# Patient Record
Sex: FEMALE | Race: WHITE | Employment: UNEMPLOYED | URBAN - METROPOLITAN AREA
[De-identification: names, ages, dates, MRNs, and addresses within clinical notes are randomized per-mention and may not be internally consistent; named-entity substitution may affect disease eponyms.]

---

## 2017-01-08 ENCOUNTER — HOSPITAL ENCOUNTER (EMERGENCY)
Facility: HOSPITAL | Age: 52
Discharge: HOME | End: 2017-01-08
Attending: EMERGENCY MEDICINE
Payer: COMMERCIAL

## 2017-01-08 VITALS
DIASTOLIC BLOOD PRESSURE: 65 MMHG | SYSTOLIC BLOOD PRESSURE: 118 MMHG | OXYGEN SATURATION: 100 % | TEMPERATURE: 98 F | RESPIRATION RATE: 20 BRPM | HEART RATE: 68 BPM | WEIGHT: 125 LBS | HEIGHT: 55 IN | BODY MASS INDEX: 28.93 KG/M2

## 2017-01-08 DIAGNOSIS — H43.391 FLOATERS IN VISUAL FIELD, RIGHT: Primary | ICD-10-CM

## 2017-01-08 PROCEDURE — 99283 EMERGENCY DEPT VISIT LOW MDM: CPT

## 2017-01-08 RX ORDER — ESCITALOPRAM OXALATE 10 MG/1
10 TABLET ORAL DAILY
COMMUNITY

## 2017-01-08 NOTE — ED INITIAL ASSESSMENT (HPI)
Pt here for visual disturbances in the right eye. Pt saw flashes yesterday and then today saw a string in the right eye then a string that has turned into floaters.

## 2017-01-08 NOTE — ED PROVIDER NOTES
Patient Seen in: [facility] Emergency Department    History   Patient presents with: Eye Visual Problem (opthalmic)    Stated Complaint: visual disturbance -     HPI    18-year-old female presents reporting floaters and flashes for the past day.   Aspen Evian Dinning Uncorrected    Physical Exam    General:  Vitals as listed. No acute distress   HEENT: Funduscopic examination shows normal red reflex bilaterally. No evidence of significant intraocular hemorrhage. I noted some debris on right-sided examination.   Pupils

## 2018-05-15 PROCEDURE — 88175 CYTOPATH C/V AUTO FLUID REDO: CPT | Performed by: OBSTETRICS & GYNECOLOGY

## 2018-05-15 PROCEDURE — 87624 HPV HI-RISK TYP POOLED RSLT: CPT | Performed by: OBSTETRICS & GYNECOLOGY

## 2020-06-06 ENCOUNTER — HOSPITAL ENCOUNTER (INPATIENT)
Facility: HOSPITAL | Age: 55
LOS: 5 days | Discharge: HOME | DRG: 394 | End: 2020-06-11
Attending: EMERGENCY MEDICINE | Admitting: HOSPITALIST
Payer: COMMERCIAL

## 2020-06-06 ENCOUNTER — APPOINTMENT (OUTPATIENT)
Dept: CT IMAGING | Facility: HOSPITAL | Age: 55
DRG: 394 | End: 2020-06-06
Attending: EMERGENCY MEDICINE
Payer: COMMERCIAL

## 2020-06-06 DIAGNOSIS — K92.1 HEMATOCHEZIA: ICD-10-CM

## 2020-06-06 DIAGNOSIS — K52.9 ACUTE COLITIS: Primary | ICD-10-CM

## 2020-06-06 PROBLEM — R73.9 HYPERGLYCEMIA: Status: ACTIVE | Noted: 2020-06-06

## 2020-06-06 PROCEDURE — 99222 1ST HOSP IP/OBS MODERATE 55: CPT | Performed by: HOSPITALIST

## 2020-06-06 PROCEDURE — 74177 CT ABD & PELVIS W/CONTRAST: CPT | Performed by: EMERGENCY MEDICINE

## 2020-06-06 RX ORDER — AZELASTINE 1 MG/ML
1 SPRAY, METERED NASAL 2 TIMES DAILY
COMMUNITY

## 2020-06-06 RX ORDER — HYDROMORPHONE HYDROCHLORIDE 1 MG/ML
0.5 INJECTION, SOLUTION INTRAMUSCULAR; INTRAVENOUS; SUBCUTANEOUS EVERY 30 MIN PRN
Status: COMPLETED | OUTPATIENT
Start: 2020-06-06 | End: 2020-06-06

## 2020-06-06 RX ORDER — CIPROFLOXACIN 2 MG/ML
400 INJECTION, SOLUTION INTRAVENOUS ONCE
Status: COMPLETED | OUTPATIENT
Start: 2020-06-06 | End: 2020-06-06

## 2020-06-06 RX ORDER — METRONIDAZOLE 500 MG/100ML
500 INJECTION, SOLUTION INTRAVENOUS ONCE
Status: COMPLETED | OUTPATIENT
Start: 2020-06-06 | End: 2020-06-06

## 2020-06-06 RX ORDER — ONDANSETRON 2 MG/ML
4 INJECTION INTRAMUSCULAR; INTRAVENOUS ONCE
Status: COMPLETED | OUTPATIENT
Start: 2020-06-06 | End: 2020-06-06

## 2020-06-06 RX ORDER — MORPHINE SULFATE 4 MG/ML
1 INJECTION, SOLUTION INTRAMUSCULAR; INTRAVENOUS EVERY 2 HOUR PRN
Status: DISCONTINUED | OUTPATIENT
Start: 2020-06-06 | End: 2020-06-11

## 2020-06-06 RX ORDER — MORPHINE SULFATE 4 MG/ML
4 INJECTION, SOLUTION INTRAMUSCULAR; INTRAVENOUS EVERY 2 HOUR PRN
Status: DISCONTINUED | OUTPATIENT
Start: 2020-06-06 | End: 2020-06-11

## 2020-06-06 RX ORDER — ONDANSETRON 2 MG/ML
4 INJECTION INTRAMUSCULAR; INTRAVENOUS EVERY 4 HOURS PRN
Status: CANCELLED | OUTPATIENT
Start: 2020-06-06

## 2020-06-06 RX ORDER — HYDROMORPHONE HYDROCHLORIDE 1 MG/ML
0.5 INJECTION, SOLUTION INTRAMUSCULAR; INTRAVENOUS; SUBCUTANEOUS EVERY 30 MIN PRN
Status: CANCELLED | OUTPATIENT
Start: 2020-06-06 | End: 2020-06-06

## 2020-06-06 RX ORDER — SODIUM CHLORIDE 9 MG/ML
INJECTION, SOLUTION INTRAVENOUS CONTINUOUS
Status: CANCELLED | OUTPATIENT
Start: 2020-06-06 | End: 2020-06-06

## 2020-06-06 RX ORDER — SODIUM CHLORIDE 9 MG/ML
INJECTION, SOLUTION INTRAVENOUS CONTINUOUS
Status: DISCONTINUED | OUTPATIENT
Start: 2020-06-06 | End: 2020-06-11

## 2020-06-06 RX ORDER — ACETAMINOPHEN 325 MG/1
650 TABLET ORAL EVERY 6 HOURS PRN
Status: DISCONTINUED | OUTPATIENT
Start: 2020-06-06 | End: 2020-06-11

## 2020-06-06 RX ORDER — HEPARIN SODIUM 5000 [USP'U]/ML
5000 INJECTION, SOLUTION INTRAVENOUS; SUBCUTANEOUS EVERY 8 HOURS SCHEDULED
Status: DISCONTINUED | OUTPATIENT
Start: 2020-06-06 | End: 2020-06-07

## 2020-06-06 RX ORDER — MORPHINE SULFATE 4 MG/ML
2 INJECTION, SOLUTION INTRAMUSCULAR; INTRAVENOUS EVERY 2 HOUR PRN
Status: DISCONTINUED | OUTPATIENT
Start: 2020-06-06 | End: 2020-06-11

## 2020-06-06 NOTE — ED PROVIDER NOTES
Patient Seen in: [facility] Emergency Department      History   Patient presents with:  GI Bleeding    Stated Complaint: Rectal bleeding     HPI    Patient presents with bloody diarrhea.   The patient states that yesterday around noon she developed t Temp 99.6 °F (37.6 °C) (Temporal)   Resp 17   Ht 157.5 cm (5' 2\")   Wt 59 kg   SpO2 97%   BMI 23.78 kg/m²         Physical Exam    General: Alert and oriented x3, appears mildly uncomfortable.   HEENT: Normocephalic, atraumatic, pupils equal round and reac pain, bloody diarrhea  TECHNIQUE:  CT scanning was performed from the dome of the diaphragm to the pubic symphysis with non-ionic intravenous contrast material. Post contrast coronal MPR imaging was performed. Dose reduction techniques were used.  Dose inf D5W (CIPRO) IVPB premix SOLN 400 mg (400 mg Intravenous New Bag 6/6/20 1376)   metRONIDAZOLE in NaCl (FLAGYL) 5 mg/ml IVPB premix 500 mg (has no administration in time range)   ondansetron HCl (ZOFRAN) injection 4 mg (4 mg Intravenous Given 6/6/20 1424)

## 2020-06-06 NOTE — H&P
[facility]  History and Physical     Laroy Coma Patient Status:  Emergency    1965 MRN [id number]   Location [address] [facility] Attending Kayla Cotto MD   Hosp Day # 0 PCP Hal Zaman     Chief Complaint: Ab • Migraines Sister     no hx of cad    Allergies:   Aspirin                   Sulfa Antibiotics           Medications:  No current facility-administered medications on file prior to encounter.    Estrogens, Conjugated 0.625 MG/GM Vaginal Cream, Place 1 g GFRNAA 78   CA 9.0   ALB 3.4      K 3.7      CO2 24.0   ALKPHO 65   AST 14*   ALT 22   BILT 0.7   TP 7.2       Estimated Creatinine Clearance: 59.1 mL/min (based on SCr of 0.84 mg/dL).     Recent Labs   Lab 06/06/20  1416   PTP 13.8   INR 1.03

## 2020-06-07 PROCEDURE — 99232 SBSQ HOSP IP/OBS MODERATE 35: CPT | Performed by: HOSPITALIST

## 2020-06-07 RX ORDER — ONDANSETRON 2 MG/ML
4 INJECTION INTRAMUSCULAR; INTRAVENOUS EVERY 6 HOURS PRN
Status: DISCONTINUED | OUTPATIENT
Start: 2020-06-07 | End: 2020-06-11

## 2020-06-07 RX ORDER — LEVOFLOXACIN 5 MG/ML
750 INJECTION, SOLUTION INTRAVENOUS EVERY 24 HOURS
Status: DISCONTINUED | OUTPATIENT
Start: 2020-06-07 | End: 2020-06-10

## 2020-06-07 RX ORDER — METRONIDAZOLE 500 MG/100ML
500 INJECTION, SOLUTION INTRAVENOUS EVERY 8 HOURS SCHEDULED
Status: DISCONTINUED | OUTPATIENT
Start: 2020-06-07 | End: 2020-06-10

## 2020-06-07 NOTE — PLAN OF CARE
Problem: Patient/Family Goals  Goal: Patient/Family Long Term Goal  Description  Patient's Long Term Goal: To go home. Interventions:  - IV fluids.  - Monitor hemoglobin.   - See additional Care Plan goals for specific interventions  6/7/2020 1055 by J signs and symptoms of bleeding or hemorrhage  - Monitor labs and vital signs for trends  - Administer supportive blood products/factors, fluids and medications as ordered and appropriate  - Administer supportive blood products/factors as ordered and approp

## 2020-06-07 NOTE — PLAN OF CARE
Pt. A&O x4, on RA, no tele ordered. Up with standby. Morphine available Q2, declines pain meds at this time. IVF 0.9 at 100/hr. Heparin d/c per MD with bloody stools. SCD in place on right leg, left leg is in a walking boot d/t recent ligament tear. profile  Outcome: Progressing     Problem: HEMATOLOGIC - ADULT  Goal: Maintains hematologic stability  Description  INTERVENTIONS  - Assess for signs and symptoms of bleeding or hemorrhage  - Monitor labs and vital signs for trends  - Administer supportive

## 2020-06-07 NOTE — PLAN OF CARE
Pt. A&O x4, on RA, no tele ordered. Up with standby. Morphine available Q2, pain reported at 4/10, given per [facility]. IVF 0.9 at 100/hr. Heparin refused, pt. Said she had bloody stools and did not want to take it due to that.  SCD in place on right leg, left le Establish a toileting routine/schedule  - Consider collaborating with pharmacy to review patient's medication profile  Outcome: Progressing     Problem: HEMATOLOGIC - ADULT  Goal: Maintains hematologic stability  Description  INTERVENTIONS  - Assess for si

## 2020-06-07 NOTE — PLAN OF CARE
NURSING ADMISSION NOTE      Patient admitted via Cart  Oriented to room. Safety precautions initiated. Bed in low position. Call light in reach. Navigator completed.

## 2020-06-08 ENCOUNTER — APPOINTMENT (OUTPATIENT)
Dept: CT IMAGING | Facility: HOSPITAL | Age: 55
DRG: 394 | End: 2020-06-08
Attending: INTERNAL MEDICINE
Payer: COMMERCIAL

## 2020-06-08 PROCEDURE — 99232 SBSQ HOSP IP/OBS MODERATE 35: CPT | Performed by: HOSPITALIST

## 2020-06-08 PROCEDURE — 74177 CT ABD & PELVIS W/CONTRAST: CPT | Performed by: INTERNAL MEDICINE

## 2020-06-08 RX ORDER — HEPARIN SODIUM 5000 [USP'U]/ML
5000 INJECTION, SOLUTION INTRAVENOUS; SUBCUTANEOUS EVERY 8 HOURS SCHEDULED
Status: DISCONTINUED | OUTPATIENT
Start: 2020-06-08 | End: 2020-06-11

## 2020-06-08 NOTE — PLAN OF CARE
Patient here with GI bleed, colitis. IV abx  IVF  No BM in 2 days  Patient experiencing what she calls \"gas pain\" and \"motion sickness\"  Stat CT scan done d/t sudden onset of abdominal pressure and pain, results discussed with Dr. Annie Mcfarland.   Patient v ADULT  Goal: Maintains hematologic stability  Description  INTERVENTIONS  - Assess for signs and symptoms of bleeding or hemorrhage  - Monitor labs and vital signs for trends  - Administer supportive blood products/factors, fluids and medications as ordere

## 2020-06-08 NOTE — PLAN OF CARE
Pt is A&O x4. VSS- no tele. SpO2 remains stable on RA. Patient c/o abd tenderness but denies need for PRN pain medication. Patient denies SOB/headache/dizziness. IVF and IV abx given as ordered. PIV to right AC with 0.9 @ 100 mL/hr.  Patient is a SBA to BR Progressing     Problem: HEMATOLOGIC - ADULT  Goal: Maintains hematologic stability  Description  INTERVENTIONS  - Assess for signs and symptoms of bleeding or hemorrhage  - Monitor labs and vital signs for trends  - Administer supportive blood products/fa

## 2020-06-08 NOTE — PROGRESS NOTES
Brief GI Note:     Consult received about colitis. No BM for 2 days per RN. Pt at stat CT abd/pelvis for worsened pain to when attempted eval. Hgb 12.0 this am. Previously with extensive L sided colitis, concerning for ischemic colitis vs infectious.  CT pen

## 2020-06-08 NOTE — PAYOR COMM NOTE
--------------  ADMISSION REVIEW     Payor: [employer] [address] #:  [id number]  Authorization Number: [id number]    Admit date: 6/6/20  Admit time: 2120       Admitting Physician: Juliana Youssef MD  Attending Physician:  Juliana Youssef MD  [facility] Review of Systems  Positive for stated complaint: Rectal bleeding   Other systems are as noted in HPI. Constitutional and vital signs reviewed. All other systems reviewed and negative except as noted above.     Physical Exam     ED Triage Vitals [06/0 CONCLUSION:  Severe colonic wall thickening and surrounding inflammatory changes along the descending colon and proximal sigmoid colon is suggestive of nonspecific colitis. Clinical correlation recommended. Small amount of free fluid in the pelvis.   No Filed:  2020  5:27 PM Date of Service:  2020  4:22 PM Status:  Signed    :  Trisha Calles MD (Physician)           [facility]  [facility] and Physical     [facility] Patient Status:  Emergency    1965 MRN [id number]   Locatio Estradiol (ESTRACE VA), Place vaginally. , Disp: , Rfl:   Benazepril HCl (LOTENSIN) 20 MG Oral Tab, Take 20 mg by mouth daily. , Disp: , Rfl:   Cetirizine HCl (ZYRTEC OR), Take  by mouth., Disp: , Rfl:     Review of Systems:   A comprehensive 14 point review -CT abdomen and pelvis with severe colonic wall thickening and surrounding inflammatory changes along the descending colon and proximal sigmoid colon suggestive of colitis  -Continue close monitoring and follow-up with GI as an outpatient  2.   Passing bloo Plan of care discussed with patient and ED physician  Earl Suarez MD  91 21 06  2:06 PM     MEDICATIONS ADMINISTERED IN LAST 1 DAY:     06/06/20 06/07/20 06/08/20   Ciprofloxacin in D5W (CIPRO) IVPB premix SOLN 400 mg   Dose: 400 mg  Freq:  Once Route: IV Dose: 1,000 mL  Freq:  Once Route: IV  Last Dose: Stopped (06/06/20 1600)  Start: 06/06/20 1419 End: 06/06/20 1600    1424-New Bag   1600-Stopped                  Medications 06/06/20 06/07/20 06/08/20   0.9% NaCl infusion   Rate: 75 mL/hr Freq: Continuous

## 2020-06-08 NOTE — PROGRESS NOTES
Patient reports increasing pressure in her abdomen and lower abdomen, Dr. Lubna Pastor notified, Dr. Stephen Tanner notified, stat CT ordered. IV pain medication given.  Patient also reports tingling in fingers and forearms after IV medication administration that subsid

## 2020-06-08 NOTE — PROGRESS NOTES
[facility]  Progress Note     Catherine York Patient Status:  Inpatient    1965 MRN [id number]   [facility] 3NE-A Attending Juan R Dexter MD   Hosp Day # 2 PCP Tony Chahal     Chief Complaint: abd pain bbpr    S: Patient still (Porcine)  5,000 Units Subcutaneous Q8H [address]   • metRONIDAZOLE  500 mg Intravenous Q8H [address]   • levofloxacin  750 mg Intravenous Q24H       ASSESSMENT / PLAN:     3 62years old female presented with signs and symptoms of colitis  -Continue Levaquin and Fla

## 2020-06-08 NOTE — PROGRESS NOTES
06/08/20 1300   Vitals   Temp 98.3 °F (36.8 °C)   Temp src Axillary   Pulse 73   Heart Rate Source Monitor   Resp 22   /68   MAP (mmHg) 90   BP Location Left arm   BP Method Automatic   Patient Position Lying   Oxygen Therapy   SpO2 99 %   O2 Radha

## 2020-06-09 ENCOUNTER — ANESTHESIA EVENT (OUTPATIENT)
Dept: ENDOSCOPY | Facility: HOSPITAL | Age: 55
DRG: 394 | End: 2020-06-09
Payer: COMMERCIAL

## 2020-06-09 PROCEDURE — 99232 SBSQ HOSP IP/OBS MODERATE 35: CPT | Performed by: HOSPITALIST

## 2020-06-09 RX ORDER — KETOROLAC TROMETHAMINE 30 MG/ML
15 INJECTION, SOLUTION INTRAMUSCULAR; INTRAVENOUS EVERY 6 HOURS PRN
Status: DISCONTINUED | OUTPATIENT
Start: 2020-06-09 | End: 2020-06-11

## 2020-06-09 NOTE — ANESTHESIA PREPROCEDURE EVALUATION
PRE-OP EVALUATION    Patient Name: Migel Romero    Pre-op Diagnosis: Ischemic colitis    Procedure(s): FLEXIBLE SIGMOIDOSCOPY    Surgeon(s) and Role:     Leighton Moseley MD - Primary    Pre-op vitals reviewed.   Temp: 98.4 °F (36.9 °C)  Pulse: 80 spray by Nasal route 2 (two) times daily. , Disp: , Rfl:   Fluticasone Propionate (FLONASE) 50 MCG/ACT Nasal Suspension, 1 spray by Nasal route daily. , Disp: , Rfl:   escitalopram 10 MG Oral Tab, Take 10 mg by mouth daily.   , Disp: , Rfl:   Benazepril HCl cm  Neck ROM: full Cardiovascular    Cardiovascular exam normal.         Dental    No notable dental history.          Pulmonary    Pulmonary exam normal.                 Other findings            ASA: 2   Plan: MAC  NPO status verified and patient meets gu

## 2020-06-09 NOTE — PLAN OF CARE
Resumed care at [address]  A&Ox4 Pt calm and cooperative  Vs wnl, RA, NSR/SB   Held heparin and NPO throughout night d/t possible flex/sig today per md notes  [email or web address] left ac  Left foot in walking boot d/t previous injury  Up with SBA, steady gait  Morphine symptoms of bleeding or hemorrhage  - Monitor labs and vital signs for trends  - Administer supportive blood products/factors, fluids and medications as ordered and appropriate  - Administer supportive blood products/factors as ordered and appropriate  Out

## 2020-06-09 NOTE — CONSULTS
[facility]                       Gastroenterology [address] [facility] Gastroenterology    [facility] Patient Status:  Inpatient    1965 MRN [id number]   [facility] 3NE-A Attending Diandra Garcia MD   Hosp Day # 3 PCP FARHAD MARC revealed persistent severe colonic wall thickening involving predominantly the descending and the proximal sigmoid colon. Her Hgb is 12.4  from 14.2 on admission. Stool for C diff was negative.   She remains hemodynamically stable without chest pain or shor NaCl infusion, , Intravenous, Continuous  acetaminophen (TYLENOL) tab 650 mg, 650 mg, Oral, Q6H PRN  morphINE sulfate (PF) 4 MG/ML injection 1 mg, 1 mg, Intravenous, Q2H PRN    Or  morphINE sulfate (PF) 4 MG/ML injection 2 mg, 2 mg, Intravenous, Q2H PRN patient reports no history of seizure, stroke, or frequent headaches  PE: /73 (BP Location: Left arm)   Pulse 80   Temp 98.4 °F (36.9 °C) (Oral)   Resp 16   Ht 62\"   Wt 129 lb 1.6 oz (58.6 kg)   SpO2 99%   BMI 23.61 kg/m²   Gen: AAO x 3, able to spe Imagin2020 CT Abdomen/Pelvis (IV Contrast, No oral)  CONCLUSION:    1. Persistent severe colonic wall thickening involving predominantly the descending colon and the proximal sigmoid colon.   There is surrounding infiltration of the colonic and examined this patient and agree with above nurse practitioner's assessment and recommendations. Briefly, this is a 63 yo F presenting with L sided colitis and hematochezia that began 6/6. Denies fevers or chills. Denies syncope.  Hgb stable as above

## 2020-06-09 NOTE — PROGRESS NOTES
[facility]  Progress Note     Sandip Rios Patient Status:  Inpatient    1965 MRN [id number]   [facility] 3NE-A Attending Kathleen Stringer MD   Hosp Day # 3 PCP Sima Severance     Chief Complaint: abd pain hematochezia    S: Mary Lou --        Estimated Creatinine Clearance: 81.4 mL/min (based on SCr of 0.61 mg/dL). Recent Labs   Lab 06/06/20  1416   PTP 13.8   INR 1.03       No results for input(s): TROP, CK in the last 168 hours. Imaging: Imaging data reviewed in Epic.

## 2020-06-10 ENCOUNTER — ANESTHESIA (OUTPATIENT)
Dept: ENDOSCOPY | Facility: HOSPITAL | Age: 55
DRG: 394 | End: 2020-06-10
Payer: COMMERCIAL

## 2020-06-10 ENCOUNTER — APPOINTMENT (OUTPATIENT)
Dept: CT IMAGING | Facility: HOSPITAL | Age: 55
DRG: 394 | End: 2020-06-10
Attending: INTERNAL MEDICINE
Payer: COMMERCIAL

## 2020-06-10 PROCEDURE — 0DBG8ZX EXCISION OF LEFT LARGE INTESTINE, VIA NATURAL OR ARTIFICIAL OPENING ENDOSCOPIC, DIAGNOSTIC: ICD-10-PCS | Performed by: INTERNAL MEDICINE

## 2020-06-10 PROCEDURE — 74174 CTA ABD&PLVS W/CONTRAST: CPT | Performed by: INTERNAL MEDICINE

## 2020-06-10 PROCEDURE — 99232 SBSQ HOSP IP/OBS MODERATE 35: CPT | Performed by: STUDENT IN AN ORGANIZED HEALTH CARE EDUCATION/TRAINING PROGRAM

## 2020-06-10 RX ORDER — NALOXONE HYDROCHLORIDE 0.4 MG/ML
80 INJECTION, SOLUTION INTRAMUSCULAR; INTRAVENOUS; SUBCUTANEOUS AS NEEDED
Status: CANCELLED | OUTPATIENT
Start: 2020-06-10 | End: 2020-06-10

## 2020-06-10 RX ORDER — POTASSIUM CHLORIDE 14.9 MG/ML
20 INJECTION INTRAVENOUS ONCE
Status: COMPLETED | OUTPATIENT
Start: 2020-06-10 | End: 2020-06-10

## 2020-06-10 RX ORDER — LIDOCAINE HYDROCHLORIDE 10 MG/ML
INJECTION, SOLUTION EPIDURAL; INFILTRATION; INTRACAUDAL; PERINEURAL AS NEEDED
Status: DISCONTINUED | OUTPATIENT
Start: 2020-06-10 | End: 2020-06-10 | Stop reason: SURG

## 2020-06-10 RX ORDER — SODIUM CHLORIDE, SODIUM LACTATE, POTASSIUM CHLORIDE, CALCIUM CHLORIDE 600; 310; 30; 20 MG/100ML; MG/100ML; MG/100ML; MG/100ML
INJECTION, SOLUTION INTRAVENOUS CONTINUOUS
Status: CANCELLED | OUTPATIENT
Start: 2020-06-10

## 2020-06-10 RX ORDER — ESCITALOPRAM OXALATE 10 MG/1
10 TABLET ORAL DAILY
Status: DISCONTINUED | OUTPATIENT
Start: 2020-06-10 | End: 2020-06-11

## 2020-06-10 RX ORDER — LISINOPRIL 10 MG/1
20 TABLET ORAL DAILY
Status: DISCONTINUED | OUTPATIENT
Start: 2020-06-10 | End: 2020-06-11

## 2020-06-10 RX ADMIN — SODIUM CHLORIDE: 9 INJECTION, SOLUTION INTRAVENOUS at 10:36:00

## 2020-06-10 RX ADMIN — LIDOCAINE HYDROCHLORIDE 50 MG: 10 INJECTION, SOLUTION EPIDURAL; INFILTRATION; INTRACAUDAL; PERINEURAL at 10:14:00

## 2020-06-10 NOTE — PROGRESS NOTES
[facility]  Progress Note     Alicia Lewis Patient Status:  Inpatient    1965 MRN [id number]   [facility] 3NE-A Attending Sami Rico MD   Hosp Day # 4 PCP Gigi Rodriguez     Chief Complaint: Abdominal pain     S: Patient s --  15  --   --    BILT 0.7  --  0.6  --   --    TP 7.2  --  6.2*  --   --        Estimated Creatinine Clearance: 81.4 mL/min (based on SCr of 0.61 mg/dL).     Recent Labs   Lab 06/06/20  1416   PTP 13.8   INR 1.03       No results for input(s): TROP, CK i

## 2020-06-10 NOTE — PLAN OF CARE
Assumed patient care at 1900  Patient A&O x 4  Still with abdominal pain and discomfort  PRN pain medication given with some relief  Patient describes it as cramping and bloating  Bowel sounds present but hypoactive.   No BM since 6/6  Plan for Flex sig tod review patient's medication profile  Outcome: Progressing     Problem: HEMATOLOGIC - ADULT  Goal: Maintains hematologic stability  Description  INTERVENTIONS  - Assess for signs and symptoms of bleeding or hemorrhage  - Monitor labs and vital signs for noemi

## 2020-06-10 NOTE — ANESTHESIA POSTPROCEDURE EVALUATION
[address] Patient Status:  Inpatient   Age/Gender 62year old female MRN [id number]   Location [address] [facility]. Attending Aline Grant MD   Hosp Day # 4 PCP Greg Perez       Anesthesia Post-op Note    Procedure(s):   FLE

## 2020-06-10 NOTE — OPERATIVE REPORT
.                                                 Operative Report-Flexible Sigmoidoscopy     PREOPERATIVE DIAGNOSIS/INDICATION: Hematochezia, colitis     POSTOPERTATIVE DIAGNOSIS:  Ischemic colitis     PROCEDURE PERFORMED: Flexible Sigmoidoscopy     INFOR

## 2020-06-10 NOTE — PLAN OF CARE
Assumed care at 0730, pt a/ox4, NSR on tele, R/A, Hep subq to resume after flex sig, 2mg morphine for pain, zofran for nausea, no BM since Saturday. Elec prot, NPO, can resume clears after flex sig. [email or web address]  Will continue to monitor.l    Flex sig resul

## 2020-06-10 NOTE — PLAN OF CARE
Patient alert and oriented x4, VS stable, RA, , NSR/SB on tele  Complaint of abdominal pain noted, Morphine prn  Flex sig tomorrow, NPO at midnight  No BMs, active bowel sounds  IVF  IVABX  Patient is ambulatory to the bathroom with standby assist  Marsha Mack

## 2020-06-11 VITALS
SYSTOLIC BLOOD PRESSURE: 117 MMHG | HEART RATE: 84 BPM | BODY MASS INDEX: 23.76 KG/M2 | RESPIRATION RATE: 15 BRPM | HEIGHT: 62 IN | DIASTOLIC BLOOD PRESSURE: 76 MMHG | TEMPERATURE: 98 F | WEIGHT: 129.13 LBS | OXYGEN SATURATION: 95 %

## 2020-06-11 PROCEDURE — 99238 HOSP IP/OBS DSCHRG MGMT 30/<: CPT | Performed by: STUDENT IN AN ORGANIZED HEALTH CARE EDUCATION/TRAINING PROGRAM

## 2020-06-11 RX ORDER — AMOXICILLIN AND CLAVULANATE POTASSIUM 875; 125 MG/1; MG/1
1 TABLET, FILM COATED ORAL EVERY 12 HOURS SCHEDULED
Status: DISCONTINUED | OUTPATIENT
Start: 2020-06-11 | End: 2020-06-11

## 2020-06-11 RX ORDER — AMOXICILLIN AND CLAVULANATE POTASSIUM 875; 125 MG/1; MG/1
1 TABLET, FILM COATED ORAL 2 TIMES DAILY
Qty: 16 TABLET | Refills: 0 | Status: SHIPPED | OUTPATIENT
Start: 2020-06-11 | End: 2020-06-19

## 2020-06-11 NOTE — PROGRESS NOTES
NURSING DISCHARGE NOTE    Discharged Home via Wheelchair. Accompanied by Support staff  Belongings Taken by patient/family. Reviewed med rec and discharge instructions with pt- verbalized understanding. Removed PIV and continuous monitoring.  Pt lef

## 2020-06-11 NOTE — PLAN OF CARE
Assumed patient care at 1900  Patient A&O x 4  No complaints of pain or discomfort this evening  Patient is trying a clear liquid diet again tonight with little to no pain.  FLD menu given to patient to look over and decide if she is ready to advance her [facility] activity  - Obtain nutritional consult as needed  - Establish a toileting routine/schedule  - Consider collaborating with pharmacy to review patient's medication profile  Outcome: Progressing     Problem: HEMATOLOGIC - ADULT  Goal: Maintains hematologic st

## 2020-06-11 NOTE — PLAN OF CARE
Problem: Patient/Family Goals  Goal: Patient/Family Long Term Goal  Description  Patient's Long Term Goal: \"feel better\"    Interventions:  - IV fluids.  - Monitor labs  - GI consult  - See additional Care Plan goals for specific interventions   Outcom injury  Description  (Example usage: patient with low platelets)  INTERVENTIONS:  - Avoid intramuscular injections, enemas and rectal medication administration  - Ensure safe mobilization of patient  - Hold pressure on venipuncture sites to achieve adequat

## 2020-06-11 NOTE — PLAN OF CARE
Problem: Patient/Family Goals  Goal: Patient/Family Long Term Goal  Description  Patient's Long Term Goal: \"feel better\"    Interventions:  - IV fluids.  - Monitor labs  - GI consult  - See additional Care Plan goals for specific interventions   6/11/2 hematologic stability  Description  INTERVENTIONS  - Assess for signs and symptoms of bleeding or hemorrhage  - Monitor labs and vital signs for trends  - Administer supportive blood products/factors, fluids and medications as ordered and appropriate  - Ad

## 2020-06-11 NOTE — PROGRESS NOTES
[facility]  Progress Note     Ventura Migels Patient Status:  Inpatient    1965 MRN [id number]   [facility] 3NE-A Attending Kwasi Hawk MD   Hosp Day # 5 PCP Kayli Mejia     Chief Complaint: Abdominal pain     S: Patient a --   --  102  --    CO2 24.0 26.0 23.0  --   --  20.0*  --    ALKPHO 65  --  50  --   --   --   --    AST 14*  --  13*  --   --   --   --    ALT 22  --  15  --   --   --   --    BILT 0.7  --  0.6  --   --   --   --    TP 7.2  --  6.2*  --   --   --   --

## 2020-06-11 NOTE — CM/SW NOTE
Care Progression Note:  Active Acute Medical Issue:   Acute colitis     Other Contributing Medical Factors/Dx.: HTN    Length of stay: 5  Avoidable Delays:   Discharge Barriers: IV ABX,   Expected discharge date: TBD   Expected discharge level of care: Favian

## 2020-06-11 NOTE — PROGRESS NOTES
[facility] Gastroenterology Progress Note    S: Pt with improving abd pain; 3 bms today; pt had agitation and chills with flagyl and zosyn     O: /85 (BP Location: Right arm)   Pulse 78   Temp 98.6 °F (37 °C) (Oral)   Resp 20   Ht 62\"   Wt 129

## 2020-06-13 NOTE — DISCHARGE SUMMARY
[facility]  DISCHARGE SUMMARY     Blanca Mercer Patient Status:  Inpatient    1965 MRN [id number]   [facility] 3NE-A Attending No att. providers found   Hosp Day # 5 PCP Gerry York     Date of Admission: 2020  Date of D Tabs  Commonly known as:  AUGMENTIN      Take 1 tablet by mouth 2 (two) times daily for 8 days.    Stop taking on:  June 19, 2020  Quantity:  16 tablet  Refills:  0        CONTINUE taking these medications      Instructions Prescription details   Azelastine

## 2021-05-31 ENCOUNTER — HOSPITAL ENCOUNTER (OUTPATIENT)
Facility: HOSPITAL | Age: 56
Setting detail: OBSERVATION
Discharge: HOME | End: 2021-06-01
Attending: EMERGENCY MEDICINE | Admitting: HOSPITALIST
Payer: COMMERCIAL

## 2021-05-31 ENCOUNTER — ANESTHESIA (OUTPATIENT)
Dept: SURGERY | Facility: HOSPITAL | Age: 56
End: 2021-05-31
Payer: COMMERCIAL

## 2021-05-31 ENCOUNTER — ANESTHESIA EVENT (OUTPATIENT)
Dept: SURGERY | Facility: HOSPITAL | Age: 56
End: 2021-05-31
Payer: COMMERCIAL

## 2021-05-31 ENCOUNTER — APPOINTMENT (OUTPATIENT)
Dept: CT IMAGING | Facility: HOSPITAL | Age: 56
End: 2021-05-31
Attending: EMERGENCY MEDICINE
Payer: COMMERCIAL

## 2021-05-31 DIAGNOSIS — K37 APPENDICITIS: ICD-10-CM

## 2021-05-31 DIAGNOSIS — K35.80 ACUTE APPENDICITIS, UNSPECIFIED ACUTE APPENDICITIS TYPE: Primary | ICD-10-CM

## 2021-05-31 PROCEDURE — 3008F BODY MASS INDEX DOCD: CPT | Performed by: SURGERY

## 2021-05-31 PROCEDURE — 3074F SYST BP LT 130 MM HG: CPT | Performed by: SURGERY

## 2021-05-31 PROCEDURE — 0DTJ4ZZ RESECTION OF APPENDIX, PERCUTANEOUS ENDOSCOPIC APPROACH: ICD-10-PCS | Performed by: SURGERY

## 2021-05-31 PROCEDURE — 99283 EMERGENCY DEPT VISIT LOW MDM: CPT | Performed by: SURGERY

## 2021-05-31 PROCEDURE — 74177 CT ABD & PELVIS W/CONTRAST: CPT | Performed by: EMERGENCY MEDICINE

## 2021-05-31 PROCEDURE — 3078F DIAST BP <80 MM HG: CPT | Performed by: SURGERY

## 2021-05-31 PROCEDURE — 99243 OFF/OP CNSLTJ NEW/EST LOW 30: CPT | Performed by: HOSPITALIST

## 2021-05-31 RX ORDER — EPHEDRINE SULFATE 50 MG/ML
INJECTION INTRAVENOUS AS NEEDED
Status: DISCONTINUED | OUTPATIENT
Start: 2021-05-31 | End: 2021-05-31 | Stop reason: SURG

## 2021-05-31 RX ORDER — BUPIVACAINE HYDROCHLORIDE AND EPINEPHRINE 5; 5 MG/ML; UG/ML
INJECTION, SOLUTION EPIDURAL; INTRACAUDAL; PERINEURAL AS NEEDED
Status: DISCONTINUED | OUTPATIENT
Start: 2021-05-31 | End: 2021-05-31 | Stop reason: HOSPADM

## 2021-05-31 RX ORDER — NALOXONE HYDROCHLORIDE 0.4 MG/ML
80 INJECTION, SOLUTION INTRAMUSCULAR; INTRAVENOUS; SUBCUTANEOUS AS NEEDED
Status: DISCONTINUED | OUTPATIENT
Start: 2021-05-31 | End: 2021-06-01 | Stop reason: HOSPADM

## 2021-05-31 RX ORDER — PHENYLEPHRINE HCL 10 MG/ML
VIAL (ML) INJECTION AS NEEDED
Status: DISCONTINUED | OUTPATIENT
Start: 2021-05-31 | End: 2021-05-31 | Stop reason: SURG

## 2021-05-31 RX ORDER — ROCURONIUM BROMIDE 10 MG/ML
INJECTION, SOLUTION INTRAVENOUS AS NEEDED
Status: DISCONTINUED | OUTPATIENT
Start: 2021-05-31 | End: 2021-05-31 | Stop reason: SURG

## 2021-05-31 RX ORDER — MIDAZOLAM HYDROCHLORIDE 1 MG/ML
INJECTION INTRAMUSCULAR; INTRAVENOUS AS NEEDED
Status: DISCONTINUED | OUTPATIENT
Start: 2021-05-31 | End: 2021-05-31 | Stop reason: SURG

## 2021-05-31 RX ORDER — HYDROMORPHONE HYDROCHLORIDE 1 MG/ML
0.4 INJECTION, SOLUTION INTRAMUSCULAR; INTRAVENOUS; SUBCUTANEOUS EVERY 5 MIN PRN
Status: DISCONTINUED | OUTPATIENT
Start: 2021-05-31 | End: 2021-06-01 | Stop reason: HOSPADM

## 2021-05-31 RX ORDER — CLINDAMYCIN PHOSPHATE 900 MG/50ML
INJECTION INTRAVENOUS AS NEEDED
Status: DISCONTINUED | OUTPATIENT
Start: 2021-05-31 | End: 2021-05-31 | Stop reason: SURG

## 2021-05-31 RX ORDER — HYDROCODONE BITARTRATE AND ACETAMINOPHEN 5; 325 MG/1; MG/1
2 TABLET ORAL AS NEEDED
Status: DISCONTINUED | OUTPATIENT
Start: 2021-05-31 | End: 2021-06-01 | Stop reason: HOSPADM

## 2021-05-31 RX ORDER — ACETAMINOPHEN 500 MG
1000 TABLET ORAL ONCE AS NEEDED
Status: ACTIVE | OUTPATIENT
Start: 2021-05-31 | End: 2021-05-31

## 2021-05-31 RX ORDER — ONDANSETRON 2 MG/ML
4 INJECTION INTRAMUSCULAR; INTRAVENOUS AS NEEDED
Status: DISCONTINUED | OUTPATIENT
Start: 2021-05-31 | End: 2021-06-01 | Stop reason: HOSPADM

## 2021-05-31 RX ORDER — ONDANSETRON 2 MG/ML
4 INJECTION INTRAMUSCULAR; INTRAVENOUS ONCE
Status: COMPLETED | OUTPATIENT
Start: 2021-05-31 | End: 2021-05-31

## 2021-05-31 RX ORDER — SODIUM CHLORIDE, SODIUM LACTATE, POTASSIUM CHLORIDE, CALCIUM CHLORIDE 600; 310; 30; 20 MG/100ML; MG/100ML; MG/100ML; MG/100ML
INJECTION, SOLUTION INTRAVENOUS CONTINUOUS
Status: DISCONTINUED | OUTPATIENT
Start: 2021-05-31 | End: 2021-06-01 | Stop reason: HOSPADM

## 2021-05-31 RX ORDER — DEXAMETHASONE SODIUM PHOSPHATE 4 MG/ML
VIAL (ML) INJECTION AS NEEDED
Status: DISCONTINUED | OUTPATIENT
Start: 2021-05-31 | End: 2021-05-31 | Stop reason: SURG

## 2021-05-31 RX ORDER — ONDANSETRON 2 MG/ML
INJECTION INTRAMUSCULAR; INTRAVENOUS AS NEEDED
Status: DISCONTINUED | OUTPATIENT
Start: 2021-05-31 | End: 2021-05-31 | Stop reason: SURG

## 2021-05-31 RX ORDER — HYDROCODONE BITARTRATE AND ACETAMINOPHEN 5; 325 MG/1; MG/1
1 TABLET ORAL AS NEEDED
Status: DISCONTINUED | OUTPATIENT
Start: 2021-05-31 | End: 2021-06-01 | Stop reason: HOSPADM

## 2021-05-31 RX ORDER — MORPHINE SULFATE 4 MG/ML
4 INJECTION, SOLUTION INTRAMUSCULAR; INTRAVENOUS EVERY 30 MIN PRN
Status: DISCONTINUED | OUTPATIENT
Start: 2021-05-31 | End: 2021-06-01 | Stop reason: HOSPADM

## 2021-05-31 RX ORDER — SODIUM CHLORIDE, SODIUM LACTATE, POTASSIUM CHLORIDE, CALCIUM CHLORIDE 600; 310; 30; 20 MG/100ML; MG/100ML; MG/100ML; MG/100ML
INJECTION, SOLUTION INTRAVENOUS CONTINUOUS PRN
Status: DISCONTINUED | OUTPATIENT
Start: 2021-05-31 | End: 2021-05-31 | Stop reason: SURG

## 2021-05-31 RX ADMIN — SODIUM CHLORIDE, SODIUM LACTATE, POTASSIUM CHLORIDE, CALCIUM CHLORIDE: 600; 310; 30; 20 INJECTION, SOLUTION INTRAVENOUS at 22:23:00

## 2021-05-31 RX ADMIN — PHENYLEPHRINE HCL 50 MCG: 10 MG/ML VIAL (ML) INJECTION at 22:47:00

## 2021-05-31 RX ADMIN — ROCURONIUM BROMIDE 30 MG: 10 INJECTION, SOLUTION INTRAVENOUS at 22:42:00

## 2021-05-31 RX ADMIN — ONDANSETRON 4 MG: 2 INJECTION INTRAMUSCULAR; INTRAVENOUS at 22:44:00

## 2021-05-31 RX ADMIN — EPHEDRINE SULFATE 5 MG: 50 INJECTION INTRAVENOUS at 22:52:00

## 2021-05-31 RX ADMIN — CLINDAMYCIN PHOSPHATE 900 MG: 900 INJECTION INTRAVENOUS at 22:47:00

## 2021-05-31 RX ADMIN — DEXAMETHASONE SODIUM PHOSPHATE 8 MG: 4 MG/ML VIAL (ML) INJECTION at 22:44:00

## 2021-05-31 RX ADMIN — MIDAZOLAM HYDROCHLORIDE 1 MG: 1 INJECTION INTRAMUSCULAR; INTRAVENOUS at 22:23:00

## 2021-05-31 RX ADMIN — PHENYLEPHRINE HCL 50 MCG: 10 MG/ML VIAL (ML) INJECTION at 22:53:00

## 2021-05-31 RX ADMIN — MIDAZOLAM HYDROCHLORIDE 1 MG: 1 INJECTION INTRAMUSCULAR; INTRAVENOUS at 22:31:00

## 2021-06-01 VITALS
SYSTOLIC BLOOD PRESSURE: 115 MMHG | DIASTOLIC BLOOD PRESSURE: 61 MMHG | HEART RATE: 75 BPM | BODY MASS INDEX: 23 KG/M2 | WEIGHT: 125 LBS | HEIGHT: 62 IN | RESPIRATION RATE: 16 BRPM | OXYGEN SATURATION: 99 % | TEMPERATURE: 99 F

## 2021-06-01 PROBLEM — K35.30 ACUTE APPENDICITIS WITH LOCALIZED PERITONITIS, WITHOUT PERFORATION, ABSCESS, OR GANGRENE: Status: ACTIVE | Noted: 2021-05-31

## 2021-06-01 PROCEDURE — 99225 SUBSEQUENT OBSERVATION CARE: CPT | Performed by: INTERNAL MEDICINE

## 2021-06-01 RX ORDER — ACETAMINOPHEN AND CODEINE PHOSPHATE 300; 30 MG/1; MG/1
2 TABLET ORAL EVERY 4 HOURS PRN
Status: DISCONTINUED | OUTPATIENT
Start: 2021-06-01 | End: 2021-06-01

## 2021-06-01 RX ORDER — POLYETHYLENE GLYCOL 3350 17 G/17G
17 POWDER, FOR SOLUTION ORAL DAILY PRN
Status: DISCONTINUED | OUTPATIENT
Start: 2021-06-01 | End: 2021-06-01

## 2021-06-01 RX ORDER — KETOROLAC TROMETHAMINE 30 MG/ML
30 INJECTION, SOLUTION INTRAMUSCULAR; INTRAVENOUS EVERY 6 HOURS PRN
Status: DISCONTINUED | OUTPATIENT
Start: 2021-06-01 | End: 2021-06-01

## 2021-06-01 RX ORDER — ESCITALOPRAM OXALATE 10 MG/1
10 TABLET ORAL EVERY EVENING
Status: DISCONTINUED | OUTPATIENT
Start: 2021-06-01 | End: 2021-06-01

## 2021-06-01 RX ORDER — FAMOTIDINE 10 MG/ML
20 INJECTION, SOLUTION INTRAVENOUS DAILY
Status: DISCONTINUED | OUTPATIENT
Start: 2021-06-01 | End: 2021-06-01

## 2021-06-01 RX ORDER — BISACODYL 10 MG
10 SUPPOSITORY, RECTAL RECTAL
Status: DISCONTINUED | OUTPATIENT
Start: 2021-06-01 | End: 2021-06-01

## 2021-06-01 RX ORDER — HYDROMORPHONE HYDROCHLORIDE 1 MG/ML
0.4 INJECTION, SOLUTION INTRAMUSCULAR; INTRAVENOUS; SUBCUTANEOUS EVERY 2 HOUR PRN
Status: DISCONTINUED | OUTPATIENT
Start: 2021-06-01 | End: 2021-06-01

## 2021-06-01 RX ORDER — AZELASTINE 1 MG/ML
1 SPRAY, METERED NASAL 2 TIMES DAILY
Status: DISCONTINUED | OUTPATIENT
Start: 2021-06-01 | End: 2021-06-01

## 2021-06-01 RX ORDER — METOCLOPRAMIDE HYDROCHLORIDE 5 MG/ML
10 INJECTION INTRAMUSCULAR; INTRAVENOUS EVERY 6 HOURS PRN
Status: DISCONTINUED | OUTPATIENT
Start: 2021-06-01 | End: 2021-06-01

## 2021-06-01 RX ORDER — HYDROCODONE BITARTRATE AND ACETAMINOPHEN 5; 325 MG/1; MG/1
1 TABLET ORAL EVERY 4 HOURS PRN
Qty: 10 TABLET | Refills: 0 | Status: SHIPPED | OUTPATIENT
Start: 2021-06-01 | End: 2021-06-08

## 2021-06-01 RX ORDER — ACETAMINOPHEN AND CODEINE PHOSPHATE 300; 30 MG/1; MG/1
1 TABLET ORAL EVERY 4 HOURS PRN
Status: DISCONTINUED | OUTPATIENT
Start: 2021-06-01 | End: 2021-06-01

## 2021-06-01 RX ORDER — SODIUM PHOSPHATE, DIBASIC AND SODIUM PHOSPHATE, MONOBASIC 7; 19 G/133ML; G/133ML
1 ENEMA RECTAL ONCE AS NEEDED
Status: DISCONTINUED | OUTPATIENT
Start: 2021-06-01 | End: 2021-06-01

## 2021-06-01 RX ORDER — SODIUM CHLORIDE, SODIUM LACTATE, POTASSIUM CHLORIDE, CALCIUM CHLORIDE 600; 310; 30; 20 MG/100ML; MG/100ML; MG/100ML; MG/100ML
INJECTION, SOLUTION INTRAVENOUS CONTINUOUS
Status: DISCONTINUED | OUTPATIENT
Start: 2021-06-01 | End: 2021-06-01

## 2021-06-01 RX ORDER — ONDANSETRON 2 MG/ML
4 INJECTION INTRAMUSCULAR; INTRAVENOUS EVERY 6 HOURS PRN
Status: DISCONTINUED | OUTPATIENT
Start: 2021-06-01 | End: 2021-06-01

## 2021-06-01 RX ORDER — ESCITALOPRAM OXALATE 10 MG/1
10 TABLET ORAL DAILY
Status: DISCONTINUED | OUTPATIENT
Start: 2021-06-01 | End: 2021-06-01

## 2021-06-01 RX ORDER — CETIRIZINE HYDROCHLORIDE 10 MG/1
10 TABLET ORAL DAILY
Status: DISCONTINUED | OUTPATIENT
Start: 2021-06-01 | End: 2021-06-01

## 2021-06-01 RX ORDER — KETOROLAC TROMETHAMINE 15 MG/ML
15 INJECTION, SOLUTION INTRAMUSCULAR; INTRAVENOUS EVERY 6 HOURS PRN
Status: DISCONTINUED | OUTPATIENT
Start: 2021-06-01 | End: 2021-06-01

## 2021-06-01 RX ORDER — ENOXAPARIN SODIUM 100 MG/ML
40 INJECTION SUBCUTANEOUS DAILY
Status: DISCONTINUED | OUTPATIENT
Start: 2021-06-01 | End: 2021-06-01

## 2021-06-01 RX ORDER — HYDROMORPHONE HYDROCHLORIDE 1 MG/ML
0.2 INJECTION, SOLUTION INTRAMUSCULAR; INTRAVENOUS; SUBCUTANEOUS EVERY 2 HOUR PRN
Status: DISCONTINUED | OUTPATIENT
Start: 2021-06-01 | End: 2021-06-01

## 2021-06-01 NOTE — ANESTHESIA POSTPROCEDURE EVALUATION
[address] Patient Status:  Emergency   Age/Gender 60year old female MRN [id number]   Location [address] Attending Fiorella Merida MD   Hosp Day # 0 PCP Saskia Reed       Anesthesia Post-op Note

## 2021-06-01 NOTE — PROGRESS NOTES
NURSING DISCHARGE NOTE    Discharged Home via Wheelchair. Accompanied by Spouse and Support staff  Belongings Taken by patient/family     Verbal and written discharge instructions given to patient. Verbalized understanding . With tolerable pain. Tolerate

## 2021-06-01 NOTE — PROGRESS NOTES
[facility]  Progress Note    Migel Romero Patient Status:  Observation    1965 MRN [id number]   [facility] 3NW-A Attending Matt Motta MD   Hosp Day # 0 PCP Junior Steven     Subjective:   The patient is resting comforta 05/31/2021    PROUR Negative 05/31/2021    UROBILINOGEN <2.0 05/31/2021    NITRITE Negative 05/31/2021    LEUUR Small 05/31/2021       Assessment:  POD #1 laparoscopic appendectomy    Plan:  1.  The patient is doing well today and is stable for discharge  2

## 2021-06-01 NOTE — ED PROVIDER NOTES
Patient Seen in: [facility] Emergency Department      History   Patient presents with:  Abdomen/Flank Pain    Stated Complaint: abdominal pain, nausea     HPI/Subjective:   HPI    Patient is a 57-year-old female comes emergency room for evaluation o Social History    Tobacco Use      Smoking status: Never Smoker      Smokeless tobacco: Never Used    Vaping Use      Vaping Use: Never used    Alcohol use: Yes      Comment: 1/day but none since colitis 6/5/20    Drug use:  No             Re Notable for the following components:       Result Value    Glucose 109 (*)     All other components within normal limits   URINALYSIS WITH CULTURE REFLEX - Abnormal; Notable for the following components:    Leukocyte Esterase Urine Small (*)     Bacteria abdominal pain with nausea   CONTRAST USED:  100cc of Omnipaque 350  FINDINGS:  LUNG BASE:  Atelectasis. LIVER:  Homogeneous enhancement. BILIARY:  There is biliary ductal dilatation involving the left lobe of the liver.   Mild distension of the gallbladder hospital for further workup.                      Disposition and Plan     Clinical Impression:  Acute appendicitis, unspecified acute appendicitis type  (primary encounter diagnosis)     Disposition:  Send to or/cath/gi  5/31/2021  9:17 pm    Follow-up:  H

## 2021-06-01 NOTE — CONSULTS
[facility]  Augustus Woodard Patient Status:  Emergency    1965 MRN [id number]   Location [address] [facility] Attending Kenrick Martin MD   Hosp Day # 0 PCP Kary Perez     Reason for consult: med mgmt    Reque reports that she has never smoked. She has never used smokeless tobacco. She reports current alcohol use. She reports that she does not use drugs.     Family History:   Family History   Problem Relation Age of Onset   • High Cholesterol Father    • Hyperten Anicteric. Neck: No lymphadenopathy. No JVD. No carotid bruits. Respiratory: Clear to auscultation bilaterally. No wheezes. No rhonchi. Cardiovascular: S1, S2. Regular rate and rhythm. No murmurs, rubs or gallops. Equal pulses.    Chest and Back: No tend

## 2021-06-01 NOTE — PLAN OF CARE
NURSING ADMISSION NOTE      Patient admitted via  bed  Oriented to room. Safety precautions initiated. Bed in low position. Call light in reach. Pt arrived onto floor in stable condition from PACU  VSS. A/ox4, drowsy.  Room air, c/o minimal pain t reports new pain  - Anticipate increased pain with activity and pre-medicate as appropriate  Outcome: Progressing     Problem: SAFETY ADULT - FALL  Goal: Free from fall injury  Description: INTERVENTIONS:  - Assess pt frequently for physical needs  - Ident

## 2021-06-01 NOTE — H&P
[address] [facility] [facility] Patient Status:  Emergency    1965 MRN [id number]   Location [address] [place] [address] Attending Hero Escalante MD   Hosp Day # 0 PCP Elijah Boast     Reason for Admission:  Acute cipriano GASTROINTESTINAL ENDOSCOPY-Dr Hailey Xavier   • [facility]  6/10/2020    Dr. Michael Ruffin     Family History   Problem Relation Age of Onset   • High Cholesterol Father    • Hypertension Father    • Cancer Mother         lung   • Alcohol and Other Diso activity, polydipsia and polyphagia  ENMT:  Negative for ear drainage, hearing loss and nasal drainage  Eyes:  Negative for eye discharge and vision loss  Gastrointestinal: Positive for abdominal pain. negative for constipation and diarrhea  Genitourinary: 11.2 05/31/2021    HGB 12.6 05/31/2021    HCT 38.1 05/31/2021    .0 05/31/2021     Lab Results   Component Value Date    INR 1.03 06/06/2020     Lab Results   Component Value Date     05/31/2021    K 3.5 05/31/2021     05/31/2021    CO2 measuring 3.1 mm.         Dictated by (CST): Vicenta Ames MD on 5/31/2021         Impression and Plan:  Patient Active Problem List:     Hyperglycemia     Acute colitis     Hematochezia     Colitis     Acute appendicitis     Acute appendicitis, unspecifi

## 2021-06-01 NOTE — ANESTHESIA PROCEDURE NOTES
Airway  Urgency: elective      General Information and Staff    Patient location during procedure: OR  Anesthesiologist: Ailyn Hernandez MD  Performed: anesthesiologist     Indications and Patient Condition  Indications for airway management: anesthesia

## 2021-06-01 NOTE — PROGRESS NOTES
[facility]  Progress Note     Danie Dial Patient Status:  Observation    1965 MRN [id number]   [facility] 3NW-A Attending Daily Sexton MD   Hosp Day # 0 PCP Mike Franks     Chief Complaint: abd pain    S: Patient the last 168 hours. Cardiac  No results for input(s): TROP, PBNP in the last 168 hours. Creatinine Kinase  No results for input(s): CK in the last 168 hours.     Inflammatory Markers  No results for input(s): CRP, MED, LDH, DDIMER in the last 168 hour

## 2021-06-01 NOTE — ANESTHESIA PREPROCEDURE EVALUATION
PRE-OP EVALUATION    Patient Name: Ольга Just    Admit Diagnosis: No admission diagnoses are documented for this encounter.     Pre-op Diagnosis: Appendicitis [K37]    LAPAROSCOPIC APPENDECTOMY    Anesthesia Procedure: LAPAROSCOPIC APPENDECTOMY (N/A Ab since colitis 6/5/20      Drug use: No     Available pre-op labs reviewed.   Lab Results   Component Value Date    WBC 11.2 (H) 05/31/2021    RBC 4.42 05/31/2021    HGB 12.6 05/31/2021    HCT 38.1 05/31/2021    MCV 86.2 05/31/2021    MCH 28.5 05/31/2021

## 2021-06-01 NOTE — ED INITIAL ASSESSMENT (HPI)
Pt presents to ed c/o lower medial abdominal pain at a 9/10 that started at 1100 today with nausea. Pt states hx of ischemic bowel, denies burning with urination, states normal bowel movement.

## 2021-06-01 NOTE — OPERATIVE REPORT
[facility]  Op Note    Belenda Sit Location: OR   CSN [id number] MRN [id number]   Admission Date 5/31/2021 Operation Date 5/31/2021   Attending Physician Carlos Dowd MD Operating Physician Piyush Carrillo MD   DATE OF OPERATION:  5/31/2 to its end, and the appendix was identified. The mesoappendix was dissected free from the appendix itself. A fire of the BELLA stapler was used to take down the appendix and the mesoappendix.  The appendix was then placed into an Endo Catch bag and withdrawn

## 2021-06-03 ENCOUNTER — PATIENT OUTREACH (OUTPATIENT)
Dept: CASE MANAGEMENT | Age: 56
End: 2021-06-03

## 2021-06-03 NOTE — PROGRESS NOTES
Patient called requesting assistance scheduling apt    Bereket Ng  [facility]  [address] [phone]  Apt made with Aretha ROSENTHAL for [employer].  June 8th @9:45am

## 2021-06-08 ENCOUNTER — OFFICE VISIT (OUTPATIENT)
Dept: SURGERY | Facility: CLINIC | Age: 56
End: 2021-06-08

## 2021-06-08 VITALS — TEMPERATURE: 97 F | HEART RATE: 74 BPM | SYSTOLIC BLOOD PRESSURE: 97 MMHG | DIASTOLIC BLOOD PRESSURE: 65 MMHG

## 2021-06-08 DIAGNOSIS — Z90.49 HISTORY OF APPENDECTOMY: Primary | ICD-10-CM

## 2021-06-08 PROBLEM — K35.30 ACUTE APPENDICITIS WITH LOCALIZED PERITONITIS, WITHOUT PERFORATION, ABSCESS, OR GANGRENE: Status: RESOLVED | Noted: 2021-05-31 | Resolved: 2021-06-08

## 2021-06-08 PROBLEM — K52.9 ACUTE COLITIS: Status: RESOLVED | Noted: 2020-06-06 | Resolved: 2021-06-08

## 2021-06-08 PROBLEM — K52.9 COLITIS: Status: RESOLVED | Noted: 2020-06-06 | Resolved: 2021-06-08

## 2021-06-08 PROCEDURE — 99024 POSTOP FOLLOW-UP VISIT: CPT | Performed by: PHYSICIAN ASSISTANT

## 2021-06-08 PROCEDURE — 3078F DIAST BP <80 MM HG: CPT | Performed by: PHYSICIAN ASSISTANT

## 2021-06-08 PROCEDURE — 3074F SYST BP LT 130 MM HG: CPT | Performed by: PHYSICIAN ASSISTANT

## 2021-06-08 NOTE — PROGRESS NOTES
Post Operative Visit Note       Active Problems  1. History of appendectomy         Chief Complaint   Patient presents with:  Appendix Problem: Post op - 5/31 Lap appey -- States still discomfort. Denies drainage. Denies fever or chills.           History o Procedure Laterality Date   • APPENDECTOMY     • COLONOSCOPY  2015   • OTHER SURGICAL HISTORY Right 1979    FRACTURE REPAIR- great toe   • OTHER SURGICAL HISTORY  06/02/2016    UPPER GASTROINTESTINAL ENDOSCOPY-Dr Paul Rossi   • [address] [facility]  6/1 unexpected weight change. HENT: Negative for hearing loss, nosebleeds, sore throat and trouble swallowing. Respiratory: Negative for apnea, cough, shortness of breath and wheezing.     Cardiovascular: Negative for chest pain, palpitations and leg swell as tolerated. 3. She is to continue with lifting restrictions of no more than 20 pounds for 4 weeks from the date of her surgery. 4. She may apply heat as needed to that her incisions.   5. She is to return to work with lifting restrictions of no more olga

## 2021-11-16 ENCOUNTER — APPOINTMENT (OUTPATIENT)
Dept: GENERAL RADIOLOGY | Facility: HOSPITAL | Age: 56
End: 2021-11-16
Attending: EMERGENCY MEDICINE
Payer: COMMERCIAL

## 2021-11-16 ENCOUNTER — HOSPITAL ENCOUNTER (EMERGENCY)
Facility: HOSPITAL | Age: 56
Discharge: HOME | End: 2021-11-16
Attending: EMERGENCY MEDICINE
Payer: COMMERCIAL

## 2021-11-16 VITALS
WEIGHT: 125 LBS | OXYGEN SATURATION: 97 % | HEIGHT: 62.5 IN | RESPIRATION RATE: 20 BRPM | BODY MASS INDEX: 22.43 KG/M2 | HEART RATE: 59 BPM | TEMPERATURE: 97 F | DIASTOLIC BLOOD PRESSURE: 93 MMHG | SYSTOLIC BLOOD PRESSURE: 135 MMHG

## 2021-11-16 DIAGNOSIS — J18.9 COMMUNITY ACQUIRED PNEUMONIA OF LEFT LOWER LOBE OF LUNG: Primary | ICD-10-CM

## 2021-11-16 PROCEDURE — 84484 ASSAY OF TROPONIN QUANT: CPT | Performed by: EMERGENCY MEDICINE

## 2021-11-16 PROCEDURE — 71045 X-RAY EXAM CHEST 1 VIEW: CPT | Performed by: EMERGENCY MEDICINE

## 2021-11-16 PROCEDURE — 99284 EMERGENCY DEPT VISIT MOD MDM: CPT

## 2021-11-16 PROCEDURE — 93010 ELECTROCARDIOGRAM REPORT: CPT

## 2021-11-16 PROCEDURE — 99285 EMERGENCY DEPT VISIT HI MDM: CPT

## 2021-11-16 PROCEDURE — 36415 COLL VENOUS BLD VENIPUNCTURE: CPT

## 2021-11-16 PROCEDURE — 80053 COMPREHEN METABOLIC PANEL: CPT | Performed by: EMERGENCY MEDICINE

## 2021-11-16 PROCEDURE — 93005 ELECTROCARDIOGRAM TRACING: CPT

## 2021-11-16 PROCEDURE — 85025 COMPLETE CBC W/AUTO DIFF WBC: CPT | Performed by: EMERGENCY MEDICINE

## 2021-11-16 RX ORDER — DOXYCYCLINE HYCLATE 100 MG/1
100 CAPSULE ORAL 2 TIMES DAILY
Qty: 14 CAPSULE | Refills: 0 | Status: SHIPPED | OUTPATIENT
Start: 2021-11-16 | End: 2021-11-23

## 2021-11-16 NOTE — ED INITIAL ASSESSMENT (HPI)
Episode of difficulty in breathing and chest tightness that resolved on its own then returned today / pt reports trouble taking deep breaths

## 2021-11-17 NOTE — ED PROVIDER NOTES
Patient Seen in: [facility] Emergency Department      History   Patient presents with:  Difficulty Breathing    Stated Complaint: genaro x 2 days, 96%    Subjective:   HPI    23-year-old female presents to the ER complaining of shortness of breath for Used    Vaping Use      Vaping Use: Never used    Alcohol use: Yes      Comment: 1/day but none since colitis 6/5/20    Drug use: No             Review of Systems    Positive for stated complaint: genaro x 2 days, 96%  Other systems are as noted in HPI.   Cons Normal   TROPONIN I - Normal   RAPID SARS-COV-2 BY PCR - Normal   CBC WITH DIFFERENTIAL WITH PLATELET    Narrative: The following orders were created for panel order CBC With Differential With Platelet.   Procedure                               Abnormal Comprehensive verbal and written discharge and follow-up instructions were provided to help prevent relapse or worsening.   Patient was instructed to follow-up with their primary care provider for further evaluation and treatment, but to return immediately

## 2021-11-17 NOTE — ED INITIAL ASSESSMENT (HPI)
Patient presents with SOB. Patient reports that a week ago while standing in line for a rental car she had an episode of sudden onset SOB that lasted for a few minutes and spontaneously resolved.   Patient states today she has had several more episodes tod

## 2022-01-24 PROBLEM — K59.09 CHRONIC CONSTIPATION: Status: ACTIVE | Noted: 2022-01-24

## 2022-01-24 PROBLEM — R12 HEARTBURN: Status: ACTIVE | Noted: 2022-01-24

## 2022-01-24 PROBLEM — T78.40XA ALLERGY: Status: ACTIVE | Noted: 2022-01-24

## 2022-01-25 ENCOUNTER — HOSPITAL ENCOUNTER (OUTPATIENT)
Dept: GENERAL RADIOLOGY | Facility: HOSPITAL | Age: 57
Discharge: HOME | End: 2022-01-25
Attending: INTERNAL MEDICINE
Payer: COMMERCIAL

## 2022-01-25 DIAGNOSIS — J18.9 COMMUNITY ACQUIRED PNEUMONIA OF LEFT LOWER LOBE OF LUNG: ICD-10-CM

## 2022-01-25 PROCEDURE — 71046 X-RAY EXAM CHEST 2 VIEWS: CPT | Performed by: INTERNAL MEDICINE

## 2022-01-31 ENCOUNTER — HOSPITAL ENCOUNTER (OUTPATIENT)
Dept: MRI IMAGING | Facility: HOSPITAL | Age: 57
Discharge: HOME | End: 2022-01-31
Attending: NURSE PRACTITIONER
Payer: COMMERCIAL

## 2022-01-31 DIAGNOSIS — K83.8 COMMON BILE DUCT DILATION: ICD-10-CM

## 2022-01-31 PROCEDURE — 74181 MRI ABDOMEN W/O CONTRAST: CPT | Performed by: NURSE PRACTITIONER

## 2022-01-31 PROCEDURE — 76376 3D RENDER W/INTRP POSTPROCES: CPT | Performed by: NURSE PRACTITIONER

## 2022-02-07 ENCOUNTER — LAB ENCOUNTER (OUTPATIENT)
Dept: LAB | Facility: HOSPITAL | Age: 57
End: 2022-02-07
Attending: INTERNAL MEDICINE
Payer: COMMERCIAL

## 2022-02-07 DIAGNOSIS — Z01.818 PRE-OP TESTING: ICD-10-CM

## 2022-02-07 LAB — SARS-COV-2 RNA RESP QL NAA+PROBE: NOT DETECTED

## 2022-02-10 ENCOUNTER — LAB ENCOUNTER (OUTPATIENT)
Dept: LAB | Facility: HOSPITAL | Age: 57
End: 2022-02-10
Attending: STUDENT IN AN ORGANIZED HEALTH CARE EDUCATION/TRAINING PROGRAM
Payer: COMMERCIAL

## 2022-02-10 DIAGNOSIS — Z01.818 PRE-OP TESTING: ICD-10-CM

## 2022-02-10 DIAGNOSIS — Z20.822 ENCOUNTER FOR PREPROCEDURE SCREENING LABORATORY TESTING FOR COVID-19: ICD-10-CM

## 2022-02-10 DIAGNOSIS — Z01.812 ENCOUNTER FOR PREPROCEDURE SCREENING LABORATORY TESTING FOR COVID-19: ICD-10-CM

## 2022-02-10 LAB — SARS-COV-2 RNA RESP QL NAA+PROBE: NOT DETECTED

## 2022-02-11 PROBLEM — K64.8 INTERNAL HEMORRHOIDS: Status: ACTIVE | Noted: 2022-02-11

## 2022-02-11 PROBLEM — Z86.010 HISTORY OF ADENOMATOUS POLYP OF COLON: Status: ACTIVE | Noted: 2022-02-11

## 2022-02-11 PROBLEM — K63.5 COLON POLYPS: Status: ACTIVE | Noted: 2022-02-11

## 2022-09-11 ENCOUNTER — HOSPITAL ENCOUNTER (EMERGENCY)
Facility: HOSPITAL | Age: 57
Discharge: HOME | End: 2022-09-11
Attending: EMERGENCY MEDICINE
Payer: COMMERCIAL

## 2022-09-11 ENCOUNTER — APPOINTMENT (OUTPATIENT)
Dept: GENERAL RADIOLOGY | Facility: HOSPITAL | Age: 57
End: 2022-09-11
Attending: EMERGENCY MEDICINE
Payer: COMMERCIAL

## 2022-09-11 VITALS
OXYGEN SATURATION: 98 % | HEART RATE: 70 BPM | SYSTOLIC BLOOD PRESSURE: 128 MMHG | HEIGHT: 63 IN | DIASTOLIC BLOOD PRESSURE: 81 MMHG | WEIGHT: 120 LBS | RESPIRATION RATE: 18 BRPM | BODY MASS INDEX: 21.26 KG/M2 | TEMPERATURE: 98 F

## 2022-09-11 DIAGNOSIS — K59.00 CONSTIPATION, UNSPECIFIED CONSTIPATION TYPE: ICD-10-CM

## 2022-09-11 DIAGNOSIS — M25.552 LEFT HIP PAIN: Primary | ICD-10-CM

## 2022-09-11 PROCEDURE — 99284 EMERGENCY DEPT VISIT MOD MDM: CPT | Performed by: EMERGENCY MEDICINE

## 2022-09-11 PROCEDURE — 72170 X-RAY EXAM OF PELVIS: CPT | Performed by: EMERGENCY MEDICINE

## 2022-09-11 RX ORDER — POLYETHYLENE GLYCOL 3350 17 G/17G
17 POWDER, FOR SOLUTION ORAL DAILY PRN
Qty: 12 EACH | Refills: 0 | Status: SHIPPED | OUTPATIENT
Start: 2022-09-11 | End: 2022-10-11

## 2022-09-11 RX ORDER — TRAMADOL HYDROCHLORIDE 50 MG/1
100 TABLET ORAL ONCE
Status: COMPLETED | OUTPATIENT
Start: 2022-09-11 | End: 2022-09-11

## 2022-09-11 RX ORDER — METHYLPREDNISOLONE 4 MG/1
TABLET ORAL
Qty: 1 EACH | Refills: 0 | Status: SHIPPED | OUTPATIENT
Start: 2022-09-11

## 2022-09-11 RX ORDER — IBUPROFEN 600 MG/1
600 TABLET ORAL ONCE
Status: DISCONTINUED | OUTPATIENT
Start: 2022-09-11 | End: 2022-09-11

## 2022-09-11 RX ORDER — DOCUSATE SODIUM 100 MG/1
100 CAPSULE, LIQUID FILLED ORAL 2 TIMES DAILY
Qty: 60 CAPSULE | Refills: 0 | Status: SHIPPED | OUTPATIENT
Start: 2022-09-11 | End: 2022-10-11

## 2022-09-11 RX ORDER — TRAMADOL HYDROCHLORIDE 50 MG/1
TABLET ORAL EVERY 6 HOURS PRN
Qty: 10 TABLET | Refills: 0 | Status: SHIPPED | OUTPATIENT
Start: 2022-09-11 | End: 2022-09-16

## 2022-09-11 RX ORDER — CYCLOBENZAPRINE HCL 10 MG
10 TABLET ORAL 3 TIMES DAILY PRN
Qty: 20 TABLET | Refills: 0 | Status: SHIPPED | OUTPATIENT
Start: 2022-09-11 | End: 2022-09-18

## 2022-09-11 RX ORDER — DIAZEPAM 5 MG/1
5 TABLET ORAL ONCE
Status: COMPLETED | OUTPATIENT
Start: 2022-09-11 | End: 2022-09-11

## 2022-09-11 RX ORDER — IBUPROFEN 600 MG/1
600 TABLET ORAL ONCE
Status: COMPLETED | OUTPATIENT
Start: 2022-09-11 | End: 2022-09-11

## 2022-09-11 NOTE — ED INITIAL ASSESSMENT (HPI)
Pt has complain of severe left hip pain at 3 am. Pt noticed soreness/tightness to left hip for the past week. Pt denies any injury to hip. Pt took 2 ibuprofen around 4 am. Pt complains of nausea.

## (undated) DEVICE — LIGHT HANDLE

## (undated) DEVICE — TROCAR: Brand: KII® SLEEVE

## (undated) DEVICE — FILTERLINE NASAL ADULT O2/CO2

## (undated) DEVICE — INSUFFLATION NEEDLE TO ESTABLISH PNEUMOPERITONEUM.: Brand: INSUFFLATION NEEDLE

## (undated) DEVICE — Device: Brand: DEFENDO AIR/WATER/SUCTION AND BIOPSY VALVE

## (undated) DEVICE — 1200CC GUARDIAN II: Brand: GUARDIAN

## (undated) DEVICE — SCD SLEEVE KNEE HI BLEND

## (undated) DEVICE — ENDOSCOPY PACK - LOWER: Brand: MEDLINE INDUSTRIES, INC.

## (undated) DEVICE — TISSUE RETRIEVAL SYSTEM: Brand: INZII RETRIEVAL SYSTEM

## (undated) DEVICE — 3M™ RED DOT™ MONITORING ELECTRODE WITH FOAM TAPE AND STICKY GEL, 50/BAG, 20/CASE, 72/PLT 2570: Brand: RED DOT™

## (undated) DEVICE — TROCAR: Brand: KII SHIELDED BLADED ACCESS SYSTEM

## (undated) DEVICE — VIOLET BRAIDED (POLYGLACTIN 910), SYNTHETIC ABSORBABLE SUTURE: Brand: COATED VICRYL

## (undated) DEVICE — LAP CHOLE/APPY CDS-LF: Brand: MEDLINE INDUSTRIES, INC.

## (undated) DEVICE — ENDOPATH ECHELON ENDOSCOPIC LINEAR CUTTER RELOADS, WHITE, 60MM: Brand: ECHELON ENDOPATH

## (undated) DEVICE — Device

## (undated) DEVICE — SOL  .9 1000ML BTL

## (undated) DEVICE — STERILE POLYISOPRENE POWDER-FREE SURGICAL GLOVES: Brand: PROTEXIS

## (undated) DEVICE — FORCEP BIOPSY RJ4 LG CAP W/ND

## (undated) DEVICE — SUTURE VICRYL 5-0 PC-1

## (undated) DEVICE — 40580 - THE PINK PAD - ADVANCED TRENDELENBURG POSITIONING KIT: Brand: 40580 - THE PINK PAD - ADVANCED TRENDELENBURG POSITIONING KIT

## (undated) NOTE — ED AVS SNAPSHOT
[facility] Emergency Department    [place]  [address] [address]    Phone:  [phone]    Fax:  Mynor Fine   MRN: [id number]    Department:  [facility] Emergency Department   Date of Visit:  1/8/ IF THERE IS ANY CHANGE OR WORSENING OF YOUR CONDITION, CALL YOUR PRIMARY CARE PHYSICIAN AT ONCE OR RETURN IMMEDIATELY TO THE EMERGENCY DEPARTMENT.     If you have been prescribed any medication(s), please fill your prescription right away and begin taking t

## (undated) NOTE — ED AVS SNAPSHOT
[facility] Emergency Department    [place]  [address] [address]    Phone:  [phone]    Fax:  Mynor Fine   MRN: [id number]    Department:  [facility] Emergency Department   Date of Visit:  1/8/ self-assessment the day after your visit. You may also receive a call from our patient liason soon after your visit. Also, some patients receive a detailed feedback survey mailed to them a week after the visit.   If you receive this, we would really apprec [facility] [address] [facility] [address] ([address] [facility] [address] ([address]) [facility] [address] [facility]. ([address]) [facility] If you have questions, you can call [phone] to talk to our [facility] Staff. Remember, [facility] is NOT to be used for urgent needs. For medical emergencies, dial 911.

## (undated) NOTE — LETTER
Audra Grover 182 [address] [address]  [place], [address] [place] [address]    Consent for Operation  Date: __________________                                Time: _______________    1.  I authorize the performance upon Rubén Ham the following operation:  Procedure procedure has been videotaped, the surgeon will obtain the original videotape. The hospital will not be responsible for storage or maintenance of this tape.   7. For the purpose of advancing medical education, I consent to the admittance of observers to the STATEMENTS REQUIRING INSERTION OR COMPLETION WERE FILLED IN.     Signature of Patient:   ___________________________    When the patient is a minor or mentally incompetent to give consent:  Signature of person authorized to consent for patient: ____________ supplements, and pills I can buy without a prescription (including street drugs/illegal medications). Failure to inform my anesthesiologist about these medicines may increase my risk of anesthetic complications. iv.  If I am allergic to anything or have ha Anesthesiologist Signature     Date   Time  I have discussed the procedure and information above with the patient (or patient’s representative) and answered their questions. The patient or their representative has agreed to have anesthesia services.     ___

## (undated) NOTE — LETTER
2021    Return to School / Work    Name: Sandip Rios        : 1965    To Whom It May Concern,    Sandip Rios had surgery on 2021 and is:    Able to return to school / work with restrictions:  No lifting over: 20 lbs until 2021

## (undated) NOTE — LETTER
Audra Grover 182  [address] [facility] [address], [address] [facility]  Authorization for Surgical Operation and Procedure     Date:___________                                                                                                         Time:__________ occur: fever and allergic reactions, hemolytic reactions, transmission of diseases such as Hepatitis, AIDS and Cytomegalovirus (CMV) and fluid overload.   In the event that I wish to have an autologous transfusion of my own blood, or a directed donor transf applicable recovery period ends for purposes of reinstating the DNAR order.   10. Patients having a sterilization procedure: I understand that if the procedure is successful the results will be permanent and it will therefore be impossible for me to insemin medicine and do additional procedures as necessary. Some examples are: Starting or using an “IV” to give me medicine, fluids or blood during my procedure, and having a breathing tube placed to help me breathe when I’m asleep (intubation).  In the event that but potential complications include headache, bleeding, infection, seizure, irregular heart rhythms, and nerve injury.     I can change my mind about having anesthesia services at any time before I get the medicine.    ______________________________________